# Patient Record
Sex: FEMALE | Race: OTHER | NOT HISPANIC OR LATINO | URBAN - METROPOLITAN AREA
[De-identification: names, ages, dates, MRNs, and addresses within clinical notes are randomized per-mention and may not be internally consistent; named-entity substitution may affect disease eponyms.]

---

## 2023-05-22 ENCOUNTER — OFFICE VISIT (OUTPATIENT)
Dept: PRIMARY CARE | Facility: CLINIC | Age: 33
End: 2023-05-22
Payer: COMMERCIAL

## 2023-05-22 VITALS
SYSTOLIC BLOOD PRESSURE: 119 MMHG | HEART RATE: 96 BPM | HEIGHT: 64 IN | BODY MASS INDEX: 32.27 KG/M2 | RESPIRATION RATE: 18 BRPM | OXYGEN SATURATION: 96 % | TEMPERATURE: 98.2 F | WEIGHT: 189 LBS | DIASTOLIC BLOOD PRESSURE: 87 MMHG

## 2023-05-22 DIAGNOSIS — M25.572 ACUTE LEFT ANKLE PAIN: Primary | ICD-10-CM

## 2023-05-22 DIAGNOSIS — Z87.312 HISTORY OF STRESS FRACTURE: ICD-10-CM

## 2023-05-22 PROBLEM — O26.643 CHOLESTASIS DURING PREGNANCY IN THIRD TRIMESTER: Status: RESOLVED | Noted: 2021-09-04 | Resolved: 2023-05-22

## 2023-05-22 PROBLEM — F43.12 CHRONIC POST-TRAUMATIC STRESS DISORDER: Status: ACTIVE | Noted: 2023-05-22

## 2023-05-22 PROBLEM — M06.4 INFLAMMATORY POLYARTHRITIS (MULTI): Status: ACTIVE | Noted: 2023-05-22

## 2023-05-22 PROBLEM — J45.909 RAD (REACTIVE AIRWAY DISEASE) (HHS-HCC): Status: ACTIVE | Noted: 2023-05-22

## 2023-05-22 PROBLEM — M84.376A STRESS FRACTURE OF FOOT: Status: RESOLVED | Noted: 2023-05-22 | Resolved: 2023-05-22

## 2023-05-22 PROBLEM — F41.1 GENERALIZED ANXIETY DISORDER: Status: ACTIVE | Noted: 2023-05-22

## 2023-05-22 PROBLEM — F43.21 ADJUSTMENT DISORDER WITH DEPRESSED MOOD: Status: RESOLVED | Noted: 2023-05-22 | Resolved: 2023-05-22

## 2023-05-22 PROBLEM — K21.9 GASTROESOPHAGEAL REFLUX DISEASE WITHOUT ESOPHAGITIS: Status: ACTIVE | Noted: 2021-02-18

## 2023-05-22 PROBLEM — M72.2 PLANTAR FASCIITIS, RIGHT: Status: RESOLVED | Noted: 2023-05-22 | Resolved: 2023-05-22

## 2023-05-22 PROBLEM — G43.909 MIGRAINES: Status: ACTIVE | Noted: 2023-05-22

## 2023-05-22 PROBLEM — R41.840 ATTENTION DEFICIT: Status: ACTIVE | Noted: 2023-05-22

## 2023-05-22 PROCEDURE — 99213 OFFICE O/P EST LOW 20 MIN: CPT | Performed by: STUDENT IN AN ORGANIZED HEALTH CARE EDUCATION/TRAINING PROGRAM

## 2023-05-22 PROCEDURE — 1036F TOBACCO NON-USER: CPT | Performed by: STUDENT IN AN ORGANIZED HEALTH CARE EDUCATION/TRAINING PROGRAM

## 2023-05-22 RX ORDER — UBROGEPANT 50 MG/1
TABLET ORAL
COMMUNITY
Start: 2022-02-22 | End: 2024-03-25 | Stop reason: WASHOUT

## 2023-05-22 RX ORDER — CALCIUM CARBONATE/VITAMIN D3 500-10/5ML
1 LIQUID (ML) ORAL DAILY
COMMUNITY
Start: 2022-02-16

## 2023-05-22 RX ORDER — OMEPRAZOLE 40 MG/1
40 CAPSULE, DELAYED RELEASE ORAL DAILY
COMMUNITY
End: 2023-06-15 | Stop reason: SDUPTHER

## 2023-05-22 RX ORDER — DROSPIRENONE AND ETHINYL ESTRADIOL 0.03MG-3MG
KIT ORAL
COMMUNITY
Start: 2022-11-01

## 2023-05-22 RX ORDER — DEXTROAMPHETAMINE SACCHARATE, AMPHETAMINE ASPARTATE, DEXTROAMPHETAMINE SULFATE AND AMPHETAMINE SULFATE 2.5; 2.5; 2.5; 2.5 MG/1; MG/1; MG/1; MG/1
TABLET ORAL
COMMUNITY
Start: 2023-05-11 | End: 2024-03-25 | Stop reason: WASHOUT

## 2023-05-22 RX ORDER — DICLOFENAC SODIUM 10 MG/G
4 GEL TOPICAL 4 TIMES DAILY PRN
Qty: 50 G | Refills: 1 | Status: SHIPPED | OUTPATIENT
Start: 2023-05-22 | End: 2024-03-25 | Stop reason: WASHOUT

## 2023-05-22 RX ORDER — ALPRAZOLAM 0.25 MG/1
0.25 TABLET ORAL DAILY
COMMUNITY

## 2023-05-22 ASSESSMENT — ENCOUNTER SYMPTOMS
INABILITY TO BEAR WEIGHT: 0
TINGLING: 0
COLOR CHANGE: 0
LOSS OF MOTION: 1
MUSCLE WEAKNESS: 0
SHORTNESS OF BREATH: 0
JOINT SWELLING: 0
LOSS OF SENSATION: 0
FEVER: 0

## 2023-05-22 NOTE — PROGRESS NOTES
Subjective   Ruma Ynag is a 33 y.o. female who presents for Ankle Pain (L ankle pain x 5 weeks. She has been wearing a boot for the last 2 weeks.).  Lower Extremity Issue  Pertinent negatives include no chest pain, fever or joint swelling. The symptoms are aggravated by movement and weight bearing.   Answers submitted by the patient for this visit:  Lower Extremity Injury Questionnaire (Submitted on 5/22/2023)  Chief Complaint: Lower extremity pain  Incident occurred: more than 1 week ago  Incident location: at home  Injury mechanism: a twisting injury, other  Pain location: left ankle  Pain quality: aching  Pain - numeric: 10/10  Pain course: fluctuating  tingling: No  inability to bear weight: No  loss of motion: Yes  loss of sensation: No  muscle weakness: No    Jumped on golf carts 5 weeks ago. Entire L leg felt sore afterwards. Then went to chiropractor 2 weeks later. L leg felt better but ankle was still sore. Pain worse with movement and weight bearing. Located in medial ankle, dull 5/10 pain but can shoot up to 10/10. Has been wearing a walking boot for the past 2.5 weeks with moderate improvement. Icing and elevating it with mild relief. . Taking ibuprofen 800mg q4h with mild relief for 3 weeks.     Has a hx of a L foot stress fx in the 3rd metatarsal in June 2022. Was in walking boot for 4-6 weeks. Has hx of b/l ankle fractures in childhood. Does elliptical for exercise occasionally. Works at a desk. Eats cheese and yogurt.     Review of Systems   Constitutional:  Negative for fever.   Respiratory:  Negative for shortness of breath.    Cardiovascular:  Negative for chest pain.   Musculoskeletal:  Positive for gait problem. Negative for joint swelling.   Skin:  Negative for color change.       Visit Vitals  /87 (BP Location: Right arm, Patient Position: Sitting)   Pulse 96   Temp 36.8 °C (98.2 °F)   Resp 18       Objective   Physical Exam  Vitals reviewed.   Constitutional:       Appearance:  Normal appearance.   HENT:      Head: Normocephalic.   Cardiovascular:      Rate and Rhythm: Normal rate and regular rhythm.   Pulmonary:      Effort: Pulmonary effort is normal.      Breath sounds: Normal breath sounds.   Musculoskeletal:      Left ankle: No swelling, deformity or ecchymosis. Tenderness present over the medial malleolus (posterior edge). Decreased range of motion. Anterior drawer test negative. Normal pulse.      Left Achilles Tendon: Angeles's test negative.      Comments: L ankle: Tenderness of deltoid ligament.  Negative squeeze test.  Pain in medial compartment reproduced with inversion and plantarflexion.  Can bear weight for more than 4 steps.   Feet:      Comments: L foot: no tenderness of midfoot, no tenderness of base of 5th metatarsal  Neurological:      Mental Status: She is alert.         Assessment/Plan   Problem List Items Addressed This Visit    None  Visit Diagnoses       Acute left ankle pain    -  Primary    Relevant Medications    diclofenac sodium (Voltaren) 1 % gel gel    Other Relevant Orders    XR ankle left 3+ views    XR foot left 3+ views    Referral to Orthopaedic Surgery    History of stress fracture        Relevant Orders    XR foot left 3+ views          5 weeks of L ankle pain, has been in a walking boot for 2.5 weeks.  Suspect sprain of deltoid ligament 4 weeks+/- posterior-medial impingement syndrome. Pain in posterior edge of medial malleolus qualifies for ankle x-ray per [facility] ankle rules.  We will also order left foot x-ray given she has a history of stress fracture.    If there is no fracture, would recommend discontinuing the boot.  Provided AAOS ankle and foot exercises, would begin range of motion exercises immediately.  Begin to wean ibuprofen, may use topical Voltaren.  Should not use ibuprofen for more than 6-8 weeks total.  May wear lace-up ankle brace for physical activity only.  Provided referral to orthopedics, follow-up with orthopedics in 1 month  if not improved, or sooner if pain is worse and not being able to tolerate being out of walking boot.  Likely needs MRI if not improving.    Terence Arreguin MD  PGY-2   Family Medicine

## 2023-05-23 NOTE — PROGRESS NOTES
I saw and evaluated the patient. I personally obtained the key and critical portions of the history and physical exam or was physically present for key and critical portions performed by the resident/fellow. I reviewed the resident/fellow's documentation and discussed the patient with the resident/fellow. I agree with the resident/fellow's medical decision making as documented in the note.  Patient wondering could jump into her car.  She is not sure how she actually did it.  She has some medial ankle tenderness.  We will get an x-ray she is already wearing a boot.  We will await the results.  May need an MRI and/or to see orthopedics.  Patient can continue to try to work on range of motion exercises.  She is to follow-up in 2 weeks to ensure proper follow-up  If any increase symptoms any numbness tingling any worsening of weakness go to the ER  Agree with assessment and plan  Lopez Ness, DO

## 2023-06-15 DIAGNOSIS — K21.9 GASTROESOPHAGEAL REFLUX DISEASE WITHOUT ESOPHAGITIS: Primary | ICD-10-CM

## 2023-06-15 RX ORDER — OMEPRAZOLE 40 MG/1
40 CAPSULE, DELAYED RELEASE ORAL DAILY
Qty: 90 CAPSULE | Refills: 1 | Status: SHIPPED | OUTPATIENT
Start: 2023-06-15

## 2023-11-07 ENCOUNTER — PHARMACY VISIT (OUTPATIENT)
Dept: PHARMACY | Facility: CLINIC | Age: 33
End: 2023-11-07
Payer: COMMERCIAL

## 2023-11-07 ENCOUNTER — SPECIALTY PHARMACY (OUTPATIENT)
Dept: PHARMACY | Facility: CLINIC | Age: 33
End: 2023-11-07

## 2023-11-17 ENCOUNTER — SPECIALTY PHARMACY (OUTPATIENT)
Dept: PHARMACY | Facility: CLINIC | Age: 33
End: 2023-11-17

## 2024-01-16 ENCOUNTER — SPECIALTY PHARMACY (OUTPATIENT)
Dept: PHARMACY | Facility: CLINIC | Age: 34
End: 2024-01-16

## 2024-01-17 ENCOUNTER — HOSPITAL ENCOUNTER (OUTPATIENT)
Dept: RADIOLOGY | Facility: CLINIC | Age: 34
Discharge: HOME | End: 2024-01-17
Payer: COMMERCIAL

## 2024-01-17 ENCOUNTER — OFFICE VISIT (OUTPATIENT)
Dept: URGENT CARE | Facility: CLINIC | Age: 34
End: 2024-01-17
Payer: COMMERCIAL

## 2024-01-17 VITALS
TEMPERATURE: 97.7 F | WEIGHT: 158 LBS | SYSTOLIC BLOOD PRESSURE: 120 MMHG | OXYGEN SATURATION: 97 % | RESPIRATION RATE: 18 BRPM | HEART RATE: 100 BPM | DIASTOLIC BLOOD PRESSURE: 82 MMHG

## 2024-01-17 DIAGNOSIS — S50.11XA CONTUSION OF RIGHT FOREARM, INITIAL ENCOUNTER: Primary | ICD-10-CM

## 2024-01-17 DIAGNOSIS — S50.11XA CONTUSION OF RIGHT FOREARM, INITIAL ENCOUNTER: ICD-10-CM

## 2024-01-17 PROCEDURE — 73090 X-RAY EXAM OF FOREARM: CPT | Mod: RT

## 2024-01-17 PROCEDURE — 99203 OFFICE O/P NEW LOW 30 MIN: CPT | Performed by: PHYSICIAN ASSISTANT

## 2024-01-17 PROCEDURE — 73130 X-RAY EXAM OF HAND: CPT | Mod: RT

## 2024-01-17 PROCEDURE — 73090 X-RAY EXAM OF FOREARM: CPT | Mod: RIGHT SIDE | Performed by: RADIOLOGY

## 2024-01-17 PROCEDURE — 73130 X-RAY EXAM OF HAND: CPT | Mod: RIGHT SIDE | Performed by: RADIOLOGY

## 2024-01-17 PROCEDURE — A4565 SLINGS: HCPCS | Performed by: PHYSICIAN ASSISTANT

## 2024-01-17 ASSESSMENT — PAIN SCALES - GENERAL: PAINLEVEL: 6

## 2024-01-17 NOTE — LETTER
January 17, 2024     Patient: Ruma Yang   YOB: 1990   Date of Visit: 1/17/2024       To Whom It May Concern:    It is my medical opinion that Ruma Yang may return to work on 19 January 2024 .    If you have any questions or concerns, please don't hesitate to call.         Sincerely,        Jyay Martinez PA-C    CC: No Recipients

## 2024-01-18 NOTE — PROGRESS NOTES
Subjective   Patient ID: Ruma Yang is a 33 y.o. female.    Patient is a 23-year-old female who complains of pain to her mid right forearm secondary to a fall injury from a ladder that occurred at approximately 1630 prior to arrival today.  Patient reports that the pain she is experiencing radiates to her right hand and fingers.  Patient denies paresthesia or paralysis to her right hand and fingers but states that flexing her fingers is painful.  Patient denies pain or injury to her right elbow and reports that she is able to flex and extend same without difficulty.  Patient denies history of fracture or surgery to her right forearm and hand.  Patient is right-hand dominant.      Arm Pain  The following portions of the chart were reviewed this encounter and updated as appropriate:   Review of Systems   HENT:  Positive for ear discharge.    Musculoskeletal:         Right Forearm Pain   All other systems reviewed and are negative.  Objective   Physical Exam  Vitals and nursing note reviewed.   Constitutional:       Appearance: Normal appearance. She is normal weight.   HENT:      Head: Normocephalic and atraumatic.   Cardiovascular:      Rate and Rhythm: Normal rate.      Pulses: Normal pulses.   Pulmonary:      Effort: Pulmonary effort is normal.      Breath sounds: Normal breath sounds.   Musculoskeletal:         General: Tenderness and signs of injury present. No swelling or deformity.      Cervical back: Normal range of motion and neck supple.      Comments: Tenderness with palpation is noted to the midshaft right forearm.  No crepitus or deformity is noted with palpation.  There is no degree of soft tissue edema noted to the right forearm, wrist and hand.  MSP to the right fingers and hand is intact and the patient has decreased range of motion of the right fingers and flexion secondary to pain only.  Exam of the right wrist is unremarkable.  Exam of the right elbow is unremarkable.  Overlying skin to the  right forearm, wrist and hand is clear without erythema or ecchymosis.  There is no abrasion, laceration or other wound noted to the skin of the right forearm, wrist and hand.   Skin:     General: Skin is warm and dry.      Capillary Refill: Capillary refill takes less than 2 seconds.      Findings: No bruising or erythema.   Neurological:      General: No focal deficit present.      Mental Status: She is alert and oriented to person, place, and time.      Motor: No weakness.      Gait: Gait normal.   Psychiatric:         Mood and Affect: Mood normal.         Behavior: Behavior normal.         Thought Content: Thought content normal.         Judgment: Judgment normal.     Assessment/Plan   Physical exam findings as noted above.  X-ray right forearm is negative for acute findings on my review the images.  X-ray right hand is negative for acute findings on my review the images.  Patient was advised that the radiologist report will be released tomorrow and she will be contacted with any acute findings.  A sling was placed to the patient's right arm by the RN with MSP intact pre- and post-placement.  Supportive care instructions were discussed and the patient verbalizes excellent understanding of same.    CLINICAL IMPRESSION:  Contusion Right Forearm    Diagnoses and all orders for this visit:  Contusion of right forearm, initial encounter  -     XR forearm right 2 views; Future  -     XR hand right 3+ views; Future  -     Arm Sling, [place]

## 2024-01-22 ENCOUNTER — SPECIALTY PHARMACY (OUTPATIENT)
Dept: PHARMACY | Facility: CLINIC | Age: 34
End: 2024-01-22

## 2024-03-25 ENCOUNTER — OFFICE VISIT (OUTPATIENT)
Dept: NEUROLOGY | Facility: CLINIC | Age: 34
End: 2024-03-25
Payer: COMMERCIAL

## 2024-03-25 VITALS
TEMPERATURE: 98.7 F | HEART RATE: 104 BPM | WEIGHT: 167 LBS | SYSTOLIC BLOOD PRESSURE: 112 MMHG | HEIGHT: 64 IN | DIASTOLIC BLOOD PRESSURE: 70 MMHG | RESPIRATION RATE: 16 BRPM | BODY MASS INDEX: 28.51 KG/M2

## 2024-03-25 DIAGNOSIS — G43.001 MIGRAINE WITHOUT AURA AND WITH STATUS MIGRAINOSUS, NOT INTRACTABLE: Primary | ICD-10-CM

## 2024-03-25 PROCEDURE — 99214 OFFICE O/P EST MOD 30 MIN: CPT | Performed by: PSYCHIATRY & NEUROLOGY

## 2024-03-25 PROCEDURE — 1036F TOBACCO NON-USER: CPT | Performed by: PSYCHIATRY & NEUROLOGY

## 2024-03-25 RX ORDER — TIZANIDINE 4 MG/1
4 TABLET ORAL NIGHTLY
Qty: 30 TABLET | Refills: 1 | Status: SHIPPED | OUTPATIENT
Start: 2024-03-25 | End: 2025-03-25

## 2024-03-25 RX ORDER — ONDANSETRON 4 MG/1
TABLET, ORALLY DISINTEGRATING ORAL
COMMUNITY
Start: 2023-11-15 | End: 2024-03-25 | Stop reason: WASHOUT

## 2024-03-25 RX ORDER — TRAMADOL HYDROCHLORIDE 50 MG/1
TABLET ORAL
COMMUNITY
Start: 2023-11-08 | End: 2024-03-25 | Stop reason: ALTCHOICE

## 2024-03-25 RX ORDER — ALBUTEROL SULFATE 90 UG/1
AEROSOL, METERED RESPIRATORY (INHALATION)
COMMUNITY
Start: 2023-05-16

## 2024-03-25 RX ORDER — MELOXICAM 7.5 MG/1
7.5 TABLET ORAL DAILY
COMMUNITY
End: 2024-03-25 | Stop reason: WASHOUT

## 2024-03-25 RX ORDER — LAMOTRIGINE 25 MG/1
2 TABLET ORAL DAILY
COMMUNITY
Start: 2024-01-11 | End: 2024-03-25 | Stop reason: WASHOUT

## 2024-03-25 RX ORDER — ACETAMINOPHEN AND CODEINE PHOSPHATE 300; 60 MG/1; MG/1
TABLET ORAL
COMMUNITY
Start: 2023-07-31

## 2024-03-25 RX ORDER — DEXTROAMPHETAMINE SACCHARATE, AMPHETAMINE ASPARTATE, DEXTROAMPHETAMINE SULFATE AND AMPHETAMINE SULFATE 3.125; 3.125; 3.125; 3.125 MG/1; MG/1; MG/1; MG/1
12.5 TABLET ORAL 3 TIMES DAILY
COMMUNITY
Start: 2024-03-21

## 2024-03-25 RX ORDER — CELECOXIB 200 MG/1
CAPSULE ORAL
COMMUNITY
Start: 2018-01-24 | End: 2024-03-25 | Stop reason: WASHOUT

## 2024-03-25 ASSESSMENT — PATIENT HEALTH QUESTIONNAIRE - PHQ9
SUM OF ALL RESPONSES TO PHQ9 QUESTIONS 1 AND 2: 0
2. FEELING DOWN, DEPRESSED OR HOPELESS: NOT AT ALL
1. LITTLE INTEREST OR PLEASURE IN DOING THINGS: NOT AT ALL

## 2024-03-25 NOTE — PATIENT INSTRUCTIONS
[unfilled]  Problem List Items Addressed This Visit       Migraines - Primary     Follow up September/October.

## 2024-03-25 NOTE — PROGRESS NOTES
Tried for pregnancy. Ended up with an ectopic pregnancy. October 2023  Currently back on birth control to get regulated.   Plans to wait a 2-3 months  to try again as had a reaction to methotrexate. Trying to build up folic acid. Is planning to stop adder al and xanax during her trying to conceive.   Was using Tylenol with codeine for migraine during pregnancy. Still has but is back to using Ubrelvy 100mg for migraine treatments    Experiencing 2 migraines per month.   Treats with Ubrelvy 100mg and relieves in 60- min.     Migraines occur usually upon awakening when they occur. Frontal  Associated light and sensitivity and nausea. Left neck tension and pain  Triggers are seasonal, stress, tension in neck    Sleep is variable. Trouble falling and staying asleep. Averages 5-6 hours per night.     Prenatal vitamins and extra folate.    Has neck pain  and pain over left occiptal region.     Assessment/Plan   Problem List Items Addressed This Visit    None     Follow up in September/ October

## 2024-09-04 PROBLEM — G89.29 CHRONIC HEADACHE DISORDER: Status: ACTIVE | Noted: 2024-09-04

## 2024-09-04 PROBLEM — R51.9 CHRONIC HEADACHE DISORDER: Status: ACTIVE | Noted: 2024-09-04

## 2024-09-04 PROBLEM — G44.009 CLUSTER HEADACHES: Status: ACTIVE | Noted: 2024-09-04

## 2024-09-04 PROBLEM — M25.561 RIGHT KNEE PAIN: Status: ACTIVE | Noted: 2024-09-04

## 2024-09-04 PROBLEM — R10.9 RIGHT FLANK PAIN: Status: ACTIVE | Noted: 2024-09-04

## 2024-09-04 PROBLEM — E04.9 GOITER: Status: ACTIVE | Noted: 2024-09-04

## 2024-09-04 PROBLEM — G43.409 HEMIPLEGIC MIGRAINE: Status: ACTIVE | Noted: 2024-09-04

## 2024-09-04 PROBLEM — E66.01 SEVERE OBESITY (MULTI): Status: ACTIVE | Noted: 2024-09-04

## 2024-09-04 PROBLEM — K27.9 PEPTIC ULCER: Status: ACTIVE | Noted: 2024-09-04

## 2024-09-04 PROBLEM — G89.18 POST-OP PAIN: Status: ACTIVE | Noted: 2023-03-07

## 2024-09-04 PROBLEM — E66.3 OVERWEIGHT WITH BODY MASS INDEX (BMI) 25.0-29.9: Status: ACTIVE | Noted: 2024-09-04

## 2024-09-04 PROBLEM — M76.50 PATELLAR TENDINITIS: Status: ACTIVE | Noted: 2024-09-04

## 2024-09-04 PROBLEM — F41.9 ANXIETY WITH SOMATIZATION: Status: ACTIVE | Noted: 2024-09-04

## 2024-09-04 PROBLEM — M79.672 LEFT FOOT PAIN: Status: ACTIVE | Noted: 2024-09-04

## 2024-09-04 PROBLEM — F45.0 ANXIETY WITH SOMATIZATION: Status: ACTIVE | Noted: 2024-09-04

## 2024-09-05 ENCOUNTER — APPOINTMENT (OUTPATIENT)
Dept: OBSTETRICS AND GYNECOLOGY | Facility: CLINIC | Age: 34
End: 2024-09-05
Payer: COMMERCIAL

## 2024-09-06 ENCOUNTER — APPOINTMENT (OUTPATIENT)
Dept: PRIMARY CARE | Facility: CLINIC | Age: 34
End: 2024-09-06
Payer: COMMERCIAL

## 2024-09-06 ENCOUNTER — OFFICE VISIT (OUTPATIENT)
Dept: OBSTETRICS AND GYNECOLOGY | Facility: CLINIC | Age: 34
End: 2024-09-06
Payer: COMMERCIAL

## 2024-09-06 VITALS
BODY MASS INDEX: 27.69 KG/M2 | SYSTOLIC BLOOD PRESSURE: 120 MMHG | DIASTOLIC BLOOD PRESSURE: 84 MMHG | HEIGHT: 64 IN | WEIGHT: 162.2 LBS

## 2024-09-06 DIAGNOSIS — Z01.419 WELL WOMAN EXAM WITH ROUTINE GYNECOLOGICAL EXAM: ICD-10-CM

## 2024-09-06 PROCEDURE — 3008F BODY MASS INDEX DOCD: CPT

## 2024-09-06 PROCEDURE — 99385 PREV VISIT NEW AGE 18-39: CPT

## 2024-09-06 PROCEDURE — 1036F TOBACCO NON-USER: CPT

## 2024-09-06 PROCEDURE — 87624 HPV HI-RISK TYP POOLED RSLT: CPT

## 2024-09-06 RX ORDER — DEXTROAMPHETAMINE SACCHARATE, AMPHETAMINE ASPARTATE, DEXTROAMPHETAMINE SULFATE AND AMPHETAMINE SULFATE 2.5; 2.5; 2.5; 2.5 MG/1; MG/1; MG/1; MG/1
10 TABLET ORAL DAILY
COMMUNITY

## 2024-09-06 ASSESSMENT — PATIENT HEALTH QUESTIONNAIRE - PHQ9
2. FEELING DOWN, DEPRESSED OR HOPELESS: NOT AT ALL
SUM OF ALL RESPONSES TO PHQ9 QUESTIONS 1 & 2: 0
1. LITTLE INTEREST OR PLEASURE IN DOING THINGS: NOT AT ALL

## 2024-09-06 NOTE — PROGRESS NOTES
Subjective   Ruma Yang is a 34 y.o. female who is here for a routine exam. No vaginal concerns at this time including abnormal discharge, odor or discomfort. She is sexually active with one partner and has no concern for STI exposure. She has no pain or bleeding with sex.       Current contraception: none  LMP: 8/26/2024  Menses: every 28 days  Last pap: 2023 per patient and abnormal requiring LEEP. Patient unsure of results and will sign release today  History of abnormal Pap smear: yes -    Due for pap: yes  Family history of uterine or ovarian cancer: no  Family history of prostate cancer: no  Family history of pancreatic cancer: no  Family hx of BRCA1/BRCA2: no  Family history of breast cancer: no  Last mammogram: N/A  Gardasil:       OB History    No obstetric history on file.         Review of Systems    Objective   There were no vitals taken for this visit.    Physical Exam  HENT:      Mouth/Throat:      Mouth: Mucous membranes are moist.   Eyes:      Conjunctiva/sclera: Conjunctivae normal.   Neck:      Thyroid: No thyroid mass, thyromegaly or thyroid tenderness.   Cardiovascular:      Rate and Rhythm: Normal rate and regular rhythm.      Pulses: Normal pulses.   Pulmonary:      Effort: Pulmonary effort is normal.      Breath sounds: Normal breath sounds.   Chest:   Breasts:     Breasts are symmetrical.      Right: Normal.      Left: Normal.   Abdominal:      General: Abdomen is flat.      Palpations: Abdomen is soft.      Hernia: There is no hernia in the left inguinal area or right inguinal area.   Genitourinary:     General: Normal vulva.      Vagina: Normal.      Cervix: Normal.      Uterus: Normal.       Adnexa: Right adnexa normal and left adnexa normal.      Comments: Pap collected  Musculoskeletal:         General: Normal range of motion.      Cervical back: Normal range of motion.   Lymphadenopathy:      Cervical: No cervical adenopathy.      Right cervical: No superficial, deep or  posterior cervical adenopathy.     Left cervical: No superficial, deep or posterior cervical adenopathy.      Lower Body: No right inguinal adenopathy. No left inguinal adenopathy.   Skin:     General: Skin is warm.      Capillary Refill: Capillary refill takes less than 2 seconds.   Neurological:      Mental Status: She is alert and oriented to person, place, and time.   Psychiatric:         Mood and Affect: Mood normal.         Behavior: Behavior normal.          Assessment/Plan   A&P --> 34 y.o. y.o woman for annual GYN exam.     - Health Maintenance -->  - Routine follow up with PCP for health maintenance examination encouraged, including TSH, cholesterol, and Vit. D evaluation.  Self breast awareness encouraged; concerning characteristics of breasts reviewed - Pt. will report general concerns, any adherent lumps, skin dimpling/puckering or color changes, and any nipple discharge.    Diet and exercise reviewed.     Pap today and if wnl, next pap recommended in one year due to hx. Patient instructed that she needs a yearly pap x3 years and then pap smear every 3 years x 25 years:- Reviewed ACOG and ASCCP guidelines with pt.     Patient trying for pregnancy.  Discussed with patient optimal timing of intercourse to improve the odds of conceiving. Discussed with patient ways to track ovulation including BBT, cervical mucus changes, and use of ovulation test strips. Recommended intercourse every other day days 10-20 of cycle. Patient encouraged to take PNV daily with 400 mcg of folic acid.     - STD Screening: declines     - Contraception Plan: none     - F/U 1 year or as needed.    JOSELINE Blanc-GUANAKITO GARDNER MA

## 2024-09-18 LAB
CYTOLOGY CMNT CVX/VAG CYTO-IMP: NORMAL
HPV HR 12 DNA GENITAL QL NAA+PROBE: NEGATIVE
HPV HR GENOTYPES PNL CVX NAA+PROBE: NEGATIVE
HPV16 DNA SPEC QL NAA+PROBE: NEGATIVE
HPV18 DNA SPEC QL NAA+PROBE: NEGATIVE
LAB AP HPV GENOTYPE QUESTION: YES
LAB AP HPV HR: NORMAL
LABORATORY COMMENT REPORT: NORMAL
PATH REPORT.TOTAL CANCER: NORMAL

## 2024-09-30 ENCOUNTER — APPOINTMENT (OUTPATIENT)
Dept: NEUROLOGY | Facility: CLINIC | Age: 34
End: 2024-09-30
Payer: COMMERCIAL

## 2024-10-21 ENCOUNTER — APPOINTMENT (OUTPATIENT)
Dept: NEUROLOGY | Facility: CLINIC | Age: 34
End: 2024-10-21
Payer: COMMERCIAL

## 2024-10-21 VITALS
TEMPERATURE: 97.7 F | HEART RATE: 104 BPM | RESPIRATION RATE: 20 BRPM | DIASTOLIC BLOOD PRESSURE: 74 MMHG | SYSTOLIC BLOOD PRESSURE: 120 MMHG

## 2024-10-21 DIAGNOSIS — G43.E19 INTRACTABLE CHRONIC MIGRAINE WITH AURA AND WITHOUT STATUS MIGRAINOSUS: Primary | ICD-10-CM

## 2024-10-21 PROCEDURE — 99214 OFFICE O/P EST MOD 30 MIN: CPT | Performed by: PSYCHIATRY & NEUROLOGY

## 2024-10-21 RX ORDER — ACETAMINOPHEN AND CODEINE PHOSPHATE 300; 60 MG/1; MG/1
1 TABLET ORAL EVERY 6 HOURS PRN
Qty: 30 TABLET | Refills: 0 | Status: SHIPPED | OUTPATIENT
Start: 2024-10-21 | End: 2024-10-31

## 2024-10-21 RX ORDER — ATOGEPANT 60 MG/1
60 TABLET ORAL DAILY
Qty: 30 TABLET | Refills: 11 | Status: SHIPPED | OUTPATIENT
Start: 2024-10-21 | End: 2025-10-21

## 2024-10-21 ASSESSMENT — PATIENT HEALTH QUESTIONNAIRE - PHQ9
SUM OF ALL RESPONSES TO PHQ9 QUESTIONS 1 AND 2: 0
1. LITTLE INTEREST OR PLEASURE IN DOING THINGS: NOT AT ALL
2. FEELING DOWN, DEPRESSED OR HOPELESS: NOT AT ALL

## 2024-10-21 NOTE — PROGRESS NOTES
"Currently trying for pregnancy and holding alprazolam, tizanidine,     Took her body a while to recover from ectopic pregnancy October 2023.     Experiencing increase in migraines past 2 months. . Stopped birth control 3 months ago.   Not as severe but more frequency.   Past month 2 per week or 8 per month.   Treating with Tylenol. Decreases pain but does not relieve completely until can sleep. 2 hours it takes pain down enough to continue to function. Dull headache.     *Had been taking Ubrelvy. 100 mg. Rarely had to take 200mg.   Stopped with trying for pregnancy . Would like clarification.   Triptans caused intolerable muscle tightening with use. Sumatriptan and Relpax    Still has Tylenol with codeine to treat migraine when she gets pregnant    Migraine occurs forehead. Describes pain as stabbing pain, some pressure.   With weather change has sinus pressure as well.   Associated light and noise sensitivity.   Triggers are are large weather temp swings, stress, anxiety.     Sleep is \"ok\" Some trouble staying asleep at times. Difficult to fall back to sleep.   Averages 6 ours.     [facility] CNP Divya Lund prescribes Xanax and Adderal. Sees monthly.   Was seeing a counselor in the same office. Has not seen since Spring. Felt she was in a good place and didn't need to see her.     Denies depression.     May be moving out of state Early 2025.    Meds tried  Sumatriptan-severe triptan effect  Relpax-severe triptan effect  Cymbalta  Magnesium.   Ubrelvy  Wellbutrin  Celexa    To review what we discussed today   Assessment/Plan   Problem List Items Addressed This Visit       Migraines - Primary    Relevant Medications    atogepant (Qulipta) 60 mg tablet tablet    acetaminophen-codeine (Tylenol w/ Codeine #4) 300-60 mg tablet    ubrogepant (Ubrelvy) 100 mg tablet tablet       Your next appointment with me is 6 months or in the 8th month.     Thank you for visiting the office of Dr Bianca Crespo. It was a " pleasure working with you today.   [address] Mon-Thursday  [facility] 5th floor [facility] Fridays  Our office phone number is [phone]. You can use this number to leave messages for [facility] or to schedule or reschedule an appointment or request refills.  If you were seen on Thursday afternoon or Friday our office will call on Monday or Tuesday to reschedule your appointment. If you do not receive a call please call us.   We are also available for messages on my chart. We make every effort to respond to your concerns by the end of the next business day.

## 2024-10-21 NOTE — ASSESSMENT & PLAN NOTE
We discussed the rhythm of trying to conceive.   On Day 1 (first day of your period) to ovulation or (day 11) we can treat as usual. Qulipta daily and Ubrelvy as needed for head pain.  On Day 11 to day 1 (possibly pregnant) use Tylenol or benedryl to treat the headaches. With a rare Tylenol 3 rescue.     Qulipta can cause constipation so please call if it does.

## 2024-10-29 ENCOUNTER — APPOINTMENT (OUTPATIENT)
Dept: OBSTETRICS AND GYNECOLOGY | Facility: CLINIC | Age: 34
End: 2024-10-29
Payer: COMMERCIAL

## 2024-10-29 VITALS — BODY MASS INDEX: 28.15 KG/M2 | WEIGHT: 164 LBS | DIASTOLIC BLOOD PRESSURE: 75 MMHG | SYSTOLIC BLOOD PRESSURE: 120 MMHG

## 2024-10-29 DIAGNOSIS — R87.612 LGSIL ON PAP SMEAR OF CERVIX: ICD-10-CM

## 2024-10-29 LAB — PREGNANCY TEST URINE, POC: NEGATIVE

## 2024-10-29 PROCEDURE — 81025 URINE PREGNANCY TEST: CPT | Performed by: ADVANCED PRACTICE MIDWIFE

## 2024-10-29 PROCEDURE — 57454 BX/CURETT OF CERVIX W/SCOPE: CPT | Performed by: ADVANCED PRACTICE MIDWIFE

## 2024-10-29 PROCEDURE — 88305 TISSUE EXAM BY PATHOLOGIST: CPT

## 2024-10-29 PROCEDURE — 88305 TISSUE EXAM BY PATHOLOGIST: CPT | Performed by: PATHOLOGY

## 2024-11-04 LAB
LABORATORY COMMENT REPORT: NORMAL
PATH REPORT.FINAL DX SPEC: NORMAL
PATH REPORT.GROSS SPEC: NORMAL
PATH REPORT.RELEVANT HX SPEC: NORMAL
PATH REPORT.TOTAL CANCER: NORMAL

## 2025-02-06 ENCOUNTER — APPOINTMENT (OUTPATIENT)
Dept: NEUROLOGY | Facility: CLINIC | Age: 35
End: 2025-02-06
Payer: COMMERCIAL

## 2025-03-11 NOTE — PROGRESS NOTES
Division of Minimally Invasive Gynecologic Surgery  [facility]    03/11/25 Gynecology Visit    CC: No chief complaint on file.      Ruma Yang is a 35 y.o. referred to our practice by ***     GI symptoms: ***  Urinary symptoms: ***  Sexual activity: ***    Prior Therapies: ***    Imaging: ***  Labs: ***     GYN Hx  Gynecologic history:  Contraception/menstrual regulation: ***  Desires Childbearing: ***  Last pap: LSIL s/p colposcopy  *** family history of breast, ovarian, uterine, colon or endometrial cancer.       OBHx: ***  Pertinent PMH: ***  Pertient PSH: LEEP 2023, ***    PMHx, PSHx, SHx, Allergies, and Medications updated in Epic.  Past Medical History:   Diagnosis Date    Abnormal Pap smear of cervix 2006    Adjustment disorder with depressed mood 05/22/2023    Depression 2006    Immunization not carried out because of patient refusal 02/16/2022    Influenza vaccination declined    Migraine Years ago    Personal history of other diseases of the respiratory system     History of asthma    Plantar fasciitis, right 05/22/2023    Rh incompatibility 2021    Stress fracture of foot 05/22/2023     Past Surgical History:   Procedure Laterality Date    CERVICAL BIOPSY  W/ LOOP ELECTRODE EXCISION  03/2023    COLPOSCOPY  Multiple last 2023    OTHER SURGICAL HISTORY  12/05/2019    No history of surgery     Allergies   Allergen Reactions    Cephalexin Itching    Cortisone Other    Keflex [Cephalexin] Hives    Penicillin Hives    Sumatriptan Other and Myalgia     Jaw pain, neck pain, arm and hand numbness, Altered mental status    Amoxicillin Rash    Erythromycin Rash       Current Outpatient Medications:     albuterol 90 mcg/actuation inhaler, INHALE TWO PUFFS BY MOUTH EVERY 6 HOURS AS NEEDED, Disp: , Rfl:     amphetamine-dextroamphetamine (Adderall) 10 mg tablet, Take 1 tablet (10 mg) by mouth 4 times a day., Disp: , Rfl:     atogepant (Qulipta) 60 mg tablet tablet, Take 1  tablet (60 mg) by mouth once daily., Disp: 30 tablet, Rfl: 11    magnesium oxide 400 mg magnesium capsule, Take 1 capsule (400 mg) by mouth once daily., Disp: , Rfl:     omeprazole (PriLOSEC) 40 mg DR capsule, Take 1 capsule (40 mg) by mouth once daily., Disp: 90 capsule, Rfl: 1    ubrogepant (Ubrelvy) 100 mg tablet tablet, Take 1 tablet (100 mg) by mouth if needed (migraine)., Disp: 16 tablet, Rfl: 3  Social History     Socioeconomic History    Marital status:      Spouse name: Not on file    Number of children: Not on file    Years of education: Not on file    Highest education level: Not on file   Occupational History    Not on file   Tobacco Use    Smoking status: Never    Smokeless tobacco: Never   Vaping Use    Vaping status: Never Used   Substance and Sexual Activity    Alcohol use: Not Currently     Comment: Rarely -1 drink over several months    Drug use: Never    Sexual activity: Yes     Partners: Male     Birth control/protection: None     Comment: Stopped BC pills to try to concieve   Other Topics Concern    Not on file   Social History Narrative    ** Merged History Encounter **          Social Drivers of Health     Financial Resource Strain: Patient Declined (11/19/2023)    Received from [employer]    Overall Financial Resource Strain (CARDIA)     Difficulty of Paying Living Expenses: Patient declined   Food Insecurity: Patient Declined (11/19/2023)    Received from [employer]    Hunger Vital Sign     Worried About Running Out of Food in the Last Year: Patient declined     Ran Out of Food in the Last Year: Patient declined   Transportation Needs: No Transportation Needs (11/19/2023)    Received from [employer]    PRAPARE - Transportation     Lack of Transportation (Medical): No     Lack of Transportation (Non-Medical): No   Physical Activity: Patient Declined (11/19/2023)    Received from [employer]    Exercise Vital Sign     Days of  Exercise per Week: Patient declined     Minutes of Exercise per Session: Patient declined   Stress: Stress Concern Present (11/19/2023)    Received from "[employer]    [employer] - Occupational Stress Questionnaire     Feeling of Stress : To some extent   Social Connections: Unknown (11/19/2023)    Received from "[employer]    Social Connection and Isolation Panel [NHANES]     Frequency of Communication with Friends and Family: Once a week     Frequency of Social Gatherings with Friends and Family: Once a week     Attends [employer] Services: Patient declined     Active Member of Clubs or Organizations: Patient declined     Attends Club or Organization Meetings: Patient declined     Marital Status:    Intimate Partner Violence: Not At Risk (11/19/2023)    Received from "[employer]    Humiliation, Afraid, Rape, and Kick questionnaire     Fear of Current or Ex-Partner: No     Emotionally Abused: No     Physically Abused: No     Sexually Abused: No   Housing Stability: Not on file     Family History   Problem Relation Name Age of Onset    Stroke Maternal Grandfather Booker     Stroke Maternal Grandmother Ellen      OB History    No obstetric history on file.            ROS: reviewed and negative    PE:There were no vitals taken for this visit.  Gen: NAD  Psych: AAOx3  Skin: no lesions  Pulm: nonlabored breathing, normal respiratory effort  Cards: normal peripheral perfusion  Lymph: no LAD in axilla or groin  GI: soft, non-tender, non-distended, no rebound/guarding, no masses  :  External Genitalia: vulva without lesions, normal hair distribution  Urethral meatus: normal size and without massesBladder: non-tender, no masses or tenderness  Vagina: normal discharge, no lesions or masses  Cervix: without discharge or lesions, no cervical masses, no CMT  Uterus: non tender and not enlarged, mobile  Adnexa: non tender and not enlarged  Anus/Perineum:  normal appearance      A/P: Ruma JENNIFER Yang is a 35 y.o. with      She will be posted for ***    RTC ***    Mami Sue MD  Division of Minimally Invasive Gynecologic Surgery  [facility]     medications (e.g. Gabapentin, Cymbalta) can be effective. At this point, I will refer her for pelvic floor physical therapy    Infertility  - trying for 8 We reviewed that the definition of infertility is no spontaneous conception within 12 month in couples <36yo or 6 month in those over age 35. We discussed that infertility work up and treatment is reserved generally to patients who have attempted conception for this time frame unless there is a clear identifiable factor that is preventing conception.Discussed at length that infertility can be both male and female factors and equal instances and that sometimes it is actually a joint factor in about 15%.   - referral to [facility]    Pelvic pain  - component of pelvic floor dysfunction  - given history and exam, possible endometriosis  - Based on the patient's history/physical exam, the patient may have a component of endometriosis. Reviewed the diagnosis of endometriosis. Reviewed endometriosis treatment options with her including no therapy, conservative medical therapy with hormonal regulatory medication, or surgical intervention.   - discussed that endometriosis may contribute to infertility. Referral to [facility]. Discussed she may need evaluation for endometriosis if infertility workup normal    Mami Sue MD  Division of Minimally Invasive Gynecologic Surgery  [facility]

## 2025-03-19 ENCOUNTER — APPOINTMENT (OUTPATIENT)
Dept: OBSTETRICS AND GYNECOLOGY | Facility: CLINIC | Age: 35
End: 2025-03-19
Payer: COMMERCIAL

## 2025-03-19 VITALS
HEIGHT: 64 IN | SYSTOLIC BLOOD PRESSURE: 118 MMHG | DIASTOLIC BLOOD PRESSURE: 80 MMHG | WEIGHT: 170 LBS | BODY MASS INDEX: 29.02 KG/M2

## 2025-03-19 DIAGNOSIS — R10.2 PELVIC PAIN IN FEMALE: Primary | ICD-10-CM

## 2025-03-19 DIAGNOSIS — Z31.69 INFERTILITY COUNSELING: ICD-10-CM

## 2025-03-19 PROCEDURE — 3008F BODY MASS INDEX DOCD: CPT | Performed by: STUDENT IN AN ORGANIZED HEALTH CARE EDUCATION/TRAINING PROGRAM

## 2025-03-19 PROCEDURE — 99214 OFFICE O/P EST MOD 30 MIN: CPT | Performed by: STUDENT IN AN ORGANIZED HEALTH CARE EDUCATION/TRAINING PROGRAM

## 2025-03-19 PROCEDURE — 1036F TOBACCO NON-USER: CPT | Performed by: STUDENT IN AN ORGANIZED HEALTH CARE EDUCATION/TRAINING PROGRAM

## 2025-03-19 ASSESSMENT — ENCOUNTER SYMPTOMS
BELCHING: 0
HEMATURIA: 0
DIARRHEA: 0
ABDOMINAL PAIN: 1
VOMITING: 0
WEIGHT LOSS: 0
CONSTIPATION: 0
FEVER: 0
ARTHRALGIAS: 1
HEMATOCHEZIA: 0
FREQUENCY: 0
FLATUS: 0
NAUSEA: 1
MYALGIAS: 1
ANOREXIA: 0
HEADACHES: 1
DYSURIA: 1

## 2025-03-19 ASSESSMENT — PAIN SCALES - GENERAL: PAINLEVEL_OUTOF10: 0-NO PAIN

## 2025-03-25 ENCOUNTER — HOSPITAL ENCOUNTER (OUTPATIENT)
Dept: RADIOLOGY | Facility: CLINIC | Age: 35
Discharge: HOME | End: 2025-03-25
Payer: COMMERCIAL

## 2025-03-25 DIAGNOSIS — R10.2 PELVIC PAIN IN FEMALE: ICD-10-CM

## 2025-03-25 PROCEDURE — 76830 TRANSVAGINAL US NON-OB: CPT | Performed by: STUDENT IN AN ORGANIZED HEALTH CARE EDUCATION/TRAINING PROGRAM

## 2025-03-25 PROCEDURE — 76856 US EXAM PELVIC COMPLETE: CPT

## 2025-03-25 PROCEDURE — 76856 US EXAM PELVIC COMPLETE: CPT | Performed by: STUDENT IN AN ORGANIZED HEALTH CARE EDUCATION/TRAINING PROGRAM

## 2025-04-21 ENCOUNTER — APPOINTMENT (OUTPATIENT)
Dept: NEUROLOGY | Facility: CLINIC | Age: 35
End: 2025-04-21
Payer: COMMERCIAL